# Patient Record
Sex: FEMALE | Race: WHITE | Employment: OTHER | URBAN - METROPOLITAN AREA
[De-identification: names, ages, dates, MRNs, and addresses within clinical notes are randomized per-mention and may not be internally consistent; named-entity substitution may affect disease eponyms.]

---

## 2019-11-04 ENCOUNTER — OFFICE VISIT (OUTPATIENT)
Dept: URGENT CARE | Facility: URGENT CARE | Age: 34
End: 2019-11-04
Payer: COMMERCIAL

## 2019-11-04 VITALS
HEART RATE: 83 BPM | DIASTOLIC BLOOD PRESSURE: 80 MMHG | SYSTOLIC BLOOD PRESSURE: 125 MMHG | TEMPERATURE: 97.7 F | WEIGHT: 104 LBS | OXYGEN SATURATION: 96 %

## 2019-11-04 DIAGNOSIS — J06.9 UPPER RESPIRATORY TRACT INFECTION, UNSPECIFIED TYPE: ICD-10-CM

## 2019-11-04 DIAGNOSIS — R07.0 THROAT PAIN: Primary | ICD-10-CM

## 2019-11-04 LAB
DEPRECATED S PYO AG THROAT QL EIA: NORMAL
SPECIMEN SOURCE: NORMAL

## 2019-11-04 PROCEDURE — 87081 CULTURE SCREEN ONLY: CPT | Performed by: PHYSICIAN ASSISTANT

## 2019-11-04 PROCEDURE — 99203 OFFICE O/P NEW LOW 30 MIN: CPT

## 2019-11-04 PROCEDURE — 87880 STREP A ASSAY W/OPTIC: CPT | Performed by: PHYSICIAN ASSISTANT

## 2019-11-04 RX ORDER — NORGESTIMATE AND ETHINYL ESTRADIOL
KIT
Refills: 2 | COMMUNITY
Start: 2019-10-31

## 2019-11-04 RX ORDER — IBUPROFEN 600 MG/1
600 TABLET, FILM COATED ORAL EVERY 6 HOURS PRN
Qty: 30 TABLET | Refills: 0 | Status: SHIPPED | OUTPATIENT
Start: 2019-11-04

## 2019-11-04 ASSESSMENT — ENCOUNTER SYMPTOMS
APPETITE CHANGE: 1
EYES NEGATIVE: 1
COUGH: 1
SORE THROAT: 1
ARTHRALGIAS: 1
CARDIOVASCULAR NEGATIVE: 1
NAUSEA: 1
HEADACHES: 1

## 2019-11-04 NOTE — PATIENT INSTRUCTIONS

## 2019-11-04 NOTE — PROGRESS NOTES
SUBJECTIVE:   Chloe Aguirre is a 34 year old female presenting with a chief complaint of   Chief Complaint   Patient presents with     Urgent Care     URI     started 3 days ago congestion. woke up this morning with a headache. dizziness and nausea the last couple hrs. Sore throat (strep ran).        She is a new patient of [facility].    URI Adult    Onset of symptoms was 3 day(s) ago.  Course of illness is worsening.    Severity moderate  Current and Associated symptoms: stuffy nose, cough - non-productive, sore throat and headache  Treatment measures tried include Tylenol/Ibuprofen.  Predisposing factors include None.        Review of Systems   Constitutional: Positive for appetite change.   HENT: Positive for congestion and sore throat.    Eyes: Negative.    Respiratory: Positive for cough.    Cardiovascular: Negative.    Gastrointestinal: Positive for nausea.   Genitourinary: Negative.    Musculoskeletal: Positive for arthralgias.   Neurological: Positive for headaches.       No past medical history on file.  No family history on file.  Current Outpatient Medications   Medication Sig Dispense Refill     TRI-LO-SPRINTEC 0.18/0.215/0.25 MG-25 MCG tablet TK 1 T PO ONCE A DAY  2     Social History     Tobacco Use     Smoking status: Former Smoker     Packs/day: 0.00     Smokeless tobacco: Never Used   Substance Use Topics     Alcohol use: Not on file       OBJECTIVE  /80   Pulse 83   Temp 97.7  F (36.5  C) (Oral)   Wt 47.2 kg (104 lb)   SpO2 96%     Physical Exam  Vitals signs and nursing note reviewed. Exam conducted with a chaperone present.   Constitutional:       Appearance: Normal appearance. She is normal weight.   HENT:      Head: Normocephalic and atraumatic.      Comments: No sinus tenderness     Right Ear: Tympanic membrane, ear canal and external ear normal.      Left Ear: Tympanic membrane, ear canal and external ear normal.      Nose: Nose normal.      Mouth/Throat:      Mouth: Mucous membranes are  moist.      Pharynx: Oropharynx is clear.   Eyes:      Extraocular Movements: Extraocular movements intact.      Conjunctiva/sclera: Conjunctivae normal.   Neck:      Musculoskeletal: Neck supple.   Cardiovascular:      Rate and Rhythm: Normal rate.      Pulses: Normal pulses.      Heart sounds: Normal heart sounds.   Pulmonary:      Effort: Pulmonary effort is normal.      Breath sounds: Normal breath sounds.   Skin:     Capillary Refill: Capillary refill takes less than 2 seconds.   Neurological:      Mental Status: She is alert and oriented to person, place, and time.         Labs:  Results for orders placed or performed in visit on 11/04/19 (from the past 24 hour(s))   Strep, Rapid Screen   Result Value Ref Range    Specimen Description Throat     Rapid Strep A Screen       NEGATIVE: No Group A streptococcal antigen detected by immunoassay, await culture report.       X-Ray was not done.    ASSESSMENT:      ICD-10-CM    1. Throat pain R07.0 Strep, Rapid Screen        Medical Decision Making:    Differential Diagnosis:  URI Adult/Peds:  Viral syndrome and Viral upper respiratory illness    Serious Comorbid Conditions:  Adult:  None    PLAN:    URI Adult:  Tylenol, Ibuprofen, Fluids, Rest, OTC cough suppressant/expectorant and OTC decongestant/antihistamine    Followup:    If not improving or if condition worsens, follow up with your Primary Care Provider    There are no Patient Instructions on file for this visit.

## 2019-11-04 NOTE — PROGRESS NOTES
SUBJECTIVE:   Chloe Aguirre is a 34 year old female presenting with a chief complaint of   Chief Complaint   Patient presents with     Urgent Care     URI     started 3 days ago congestion. woke up this morning with a headache. dizziness and nausea the last couple hrs. Sore throat (strep ran).        She is a new patient of [facility].    URI Adult    Onset of symptoms was 3 day(s) ago.  Course of illness is waxing and waning.    Severity moderate  Current and Associated symptoms: stuffy nose, cough - non-productive, sore throat and headache  Treatment measures tried include Tylenol/Ibuprofen and Decongestants.  Predisposing factors include None.        Review of Systems    No past medical history on file.  No family history on file.  Current Outpatient Medications   Medication Sig Dispense Refill     TRI-LO-SPRINTEC 0.18/0.215/0.25 MG-25 MCG tablet TK 1 T PO ONCE A DAY  2     Social History     Tobacco Use     Smoking status: Former Smoker     Packs/day: 0.00     Smokeless tobacco: Never Used   Substance Use Topics     Alcohol use: Not on file       OBJECTIVE  /80   Pulse 83   Temp 97.7  F (36.5  C) (Oral)   Wt 47.2 kg (104 lb)   SpO2 96%     Physical Exam    Labs:  Results for orders placed or performed in visit on 11/04/19 (from the past 24 hour(s))   Strep, Rapid Screen   Result Value Ref Range    Specimen Description Throat     Rapid Strep A Screen       NEGATIVE: No Group A streptococcal antigen detected by immunoassay, await culture report.       X-Ray was not done.    ASSESSMENT:      ICD-10-CM    1. Throat pain R07.0 Strep, Rapid Screen        Medical Decision Making:    Differential Diagnosis:  URI Adult/Peds:  Viral syndrome and Viral upper respiratory illness    Serious Comorbid Conditions:  Adult:  None    PLAN:    URI Adult:  Tylenol, Ibuprofen, Fluids, Rest, OTC cough suppressant/expectorant, OTC decongestant/antihistamine and ibuprofen 600 mg TID    Followup:    If not improving or if condition  worsens, follow up with your Primary Care Provider    There are no Patient Instructions on file for this visit.

## 2019-11-05 LAB
BACTERIA SPEC CULT: NORMAL
SPECIMEN SOURCE: NORMAL

## 2020-03-11 ENCOUNTER — HEALTH MAINTENANCE LETTER (OUTPATIENT)
Age: 35
End: 2020-03-11

## 2021-01-04 ENCOUNTER — HEALTH MAINTENANCE LETTER (OUTPATIENT)
Age: 36
End: 2021-01-04

## 2021-04-25 ENCOUNTER — HEALTH MAINTENANCE LETTER (OUTPATIENT)
Age: 36
End: 2021-04-25

## 2021-10-10 ENCOUNTER — HEALTH MAINTENANCE LETTER (OUTPATIENT)
Age: 36
End: 2021-10-10

## 2022-05-22 ENCOUNTER — HEALTH MAINTENANCE LETTER (OUTPATIENT)
Age: 37
End: 2022-05-22

## 2022-09-18 ENCOUNTER — HEALTH MAINTENANCE LETTER (OUTPATIENT)
Age: 37
End: 2022-09-18

## 2023-06-04 ENCOUNTER — HEALTH MAINTENANCE LETTER (OUTPATIENT)
Age: 38
End: 2023-06-04

## 2024-02-25 ENCOUNTER — HEALTH MAINTENANCE LETTER (OUTPATIENT)
Age: 39
End: 2024-02-25